# Patient Record
Sex: MALE | Race: WHITE | NOT HISPANIC OR LATINO
[De-identification: names, ages, dates, MRNs, and addresses within clinical notes are randomized per-mention and may not be internally consistent; named-entity substitution may affect disease eponyms.]

---

## 2024-05-23 PROBLEM — Z00.00 ENCOUNTER FOR PREVENTIVE HEALTH EXAMINATION: Status: ACTIVE | Noted: 2024-05-23

## 2024-06-16 ENCOUNTER — NON-APPOINTMENT (OUTPATIENT)
Age: 39
End: 2024-06-16

## 2024-06-17 ENCOUNTER — NON-APPOINTMENT (OUTPATIENT)
Age: 39
End: 2024-06-17

## 2024-06-17 ENCOUNTER — APPOINTMENT (OUTPATIENT)
Dept: COLORECTAL SURGERY | Facility: CLINIC | Age: 39
End: 2024-06-17
Payer: COMMERCIAL

## 2024-06-17 VITALS
DIASTOLIC BLOOD PRESSURE: 104 MMHG | HEART RATE: 64 BPM | TEMPERATURE: 97.3 F | HEIGHT: 73 IN | WEIGHT: 224 LBS | SYSTOLIC BLOOD PRESSURE: 154 MMHG | BODY MASS INDEX: 29.69 KG/M2

## 2024-06-17 DIAGNOSIS — F41.9 ANXIETY DISORDER, UNSPECIFIED: ICD-10-CM

## 2024-06-17 DIAGNOSIS — Z78.9 OTHER SPECIFIED HEALTH STATUS: ICD-10-CM

## 2024-06-17 DIAGNOSIS — K64.8 OTHER HEMORRHOIDS: ICD-10-CM

## 2024-06-17 PROCEDURE — 46600 DIAGNOSTIC ANOSCOPY SPX: CPT

## 2024-06-17 PROCEDURE — 99203 OFFICE O/P NEW LOW 30 MIN: CPT | Mod: 25

## 2024-06-17 RX ORDER — PROPRANOLOL HYDROCHLORIDE 10 MG/1
10 TABLET ORAL
Refills: 0 | Status: ACTIVE | COMMUNITY

## 2024-06-17 NOTE — HISTORY OF PRESENT ILLNESS
[FreeTextEntry1] : 39 y/o M presents for initial evaluation of anal skin tag  Referred by PCP Dr. Nnamdi Salmon    PMH: Anxiety PSH: Shoulder labrum repair, hemorrhoid removal  All: NKDA, Pollen, Animal dander  Meds: Propranolol 10 mg  FMH: Mother (+) Waldenstrom macroglobulinemia   Patient reports history of hemorrhoid surgery performed in [place] 2 years prior.  Intermittent anal discomfort.  Prior history of fissure and hemorrhoid issues.  Currently complains of anal tag/hemorrhoid interfering with hygiene.

## 2024-06-17 NOTE — PHYSICAL EXAM
[Skin Tags] : residual hemorrhoidal skin tags were noted [Excoriation] : no perianal excoriation [de-identified] : Evidence of epithelialized posterior anal fissure.  No residual active fissure at this time. [de-identified] : Left posterior lateral base large external hemorrhoid. [FreeTextEntry1] : Medical assistant was present for the entire exam.  Anoscopy was performed for evaluation of the patients rectal bleeding  history . The risks, benefits and alternatives were reviewed.  A lighted anoscope was passed into the anal canal and the entire anal mucosal surface was inspected..   The findings revealed minimal internal hemorrhoids. No masses or lesions were identified.

## 2024-06-17 NOTE — ASSESSMENT
[FreeTextEntry1] : I reviewed with the patient his treatment options including continued conservative management versus surgical excision.  Patient wishes to proceed with surgery.  Risks, benefits alternatives outlined.  Risks of possible but not limited to postprocedure pain, bleeding and need for future procedures as well as recurrent anal fissure disease and recurrent tag outlined.  All questions answered.  Appropriate preoperative instructions reviewed.

## 2024-07-09 ENCOUNTER — TRANSCRIPTION ENCOUNTER (OUTPATIENT)
Age: 39
End: 2024-07-09

## 2024-07-15 ENCOUNTER — TRANSCRIPTION ENCOUNTER (OUTPATIENT)
Age: 39
End: 2024-07-15

## 2024-08-01 ENCOUNTER — APPOINTMENT (OUTPATIENT)
Dept: COLORECTAL SURGERY | Facility: HOSPITAL | Age: 39
End: 2024-08-01